# Patient Record
Sex: FEMALE | Race: WHITE | NOT HISPANIC OR LATINO | URBAN - METROPOLITAN AREA
[De-identification: names, ages, dates, MRNs, and addresses within clinical notes are randomized per-mention and may not be internally consistent; named-entity substitution may affect disease eponyms.]

---

## 2018-02-23 ENCOUNTER — OFFICE VISIT CONVERTED (OUTPATIENT)
Dept: PULMONOLOGY | Facility: CLINIC | Age: 78
End: 2018-02-23
Attending: INTERNAL MEDICINE

## 2018-02-28 ENCOUNTER — OFFICE VISIT CONVERTED (OUTPATIENT)
Dept: PULMONOLOGY | Facility: CLINIC | Age: 78
End: 2018-02-28
Attending: INTERNAL MEDICINE

## 2021-05-28 VITALS
HEART RATE: 82 BPM | HEART RATE: 84 BPM | DIASTOLIC BLOOD PRESSURE: 58 MMHG | BODY MASS INDEX: 43.1 KG/M2 | HEIGHT: 62 IN | WEIGHT: 234.19 LBS | TEMPERATURE: 98.3 F | RESPIRATION RATE: 12 BRPM | RESPIRATION RATE: 14 BRPM | WEIGHT: 237 LBS | SYSTOLIC BLOOD PRESSURE: 146 MMHG | TEMPERATURE: 97.9 F | BODY MASS INDEX: 43.61 KG/M2 | HEIGHT: 62 IN | SYSTOLIC BLOOD PRESSURE: 143 MMHG | DIASTOLIC BLOOD PRESSURE: 71 MMHG | OXYGEN SATURATION: 94 % | OXYGEN SATURATION: 98 %

## 2021-05-28 NOTE — PROGRESS NOTES
Patient: NINO EDWARDS     Acct: [id number]     Report: #[id number]  UNIT #: [id number]     : 1940    Encounter Date:2018  PRIMARY CARE: RAJWINDER MADERA  ***Signed***  --------------------------------------------------------------------------------------------------------------------  Chief Complaint      Encounter Date      2018            Referring Provider      KVNG SAHU            Patient Complaint      Patient is complaining of       1 Week F/U Pft Results/ Cxr Results            VITALS      Height 5 ft 2.00 in / 157.48 cm      Weight 234 lbs 3.000 oz / 106.25691 kg      BSA 2.22 m2      BMI 42.8 kg/m2      Temperature 97.9 F / 36.61 C - Oral      Pulse 84      Respirations 12      Blood Pressure 146/58 Sitting, Left Arm      Pulse Oximetry 94%, room aior@rest            HPI      The patient is a 77 year old obese female who presents for short term follow up     for surgical clearance. She was last seen in the office one week ago at which     time pulmonary function testing was performed and she was started on Utibron.      Since starting Utibron, the patient states she has been able to be more active     and has less short of breath.  She does have a diagnosis of COPD and prior     smoking history of 25 pack years.  Today she is denying any wheezing, dyspnea     at rest, hemoptysis, fevers, night sweats or chills.  She is accompanied by her     son today. She is anticipating surgery in the near future PE tubes. She has     chronic otitis media.              REVIEW OF SYSTEMS:  Updated in the chart.            Past medical, family, social and surgical history updated in the chart,     unchanged since last visit.            MEDICATIONS:  Reviewed and updated.            ROS      Constitutional:  Denies: Fatigue, Fever, Weight gain, Weight loss, Chills,     Insomnia, Other      Respiratory/Breathing:  Denies: Shortness of air, Wheezing, Cough, Hemoptysis,     Pleuritic pain,  Other      Endocrine:  Denies: Polydipsia, Polyuria, Heat/cold intolerance, Abnorml     menstrual pattern, Diabetes, Other      Eyes:  Denies: Blurred vision, Vision Changes, Other      Ears, nose, mouth, throat:  Denies: Congestion, Dysphagia, Hearing Changes,     Nose Bleeding, Nasal Discharge, Throat pain, Tinnitus, Other      Cardiovascular:  Denies: Chest Pain, Exertional dyspnea, Peripheral Edema,     Palpitations, Syncope, Wake up Gasping for air, Orthopnea, Tachycardia, Other      Gastrointestinal:  Denies: Abdominal pain/cramping, Bloody stools, Constipation    , Diarrhea, Melena, Nausea, Vomiting, Other      Genitourinary:  Denies: Dysuria, Urinary frequency, Incontinence, Hematuria,     Urgency, Other      Musculoskeletal:  Denies: Joint Pain, Joint Stiffness, Joint Swelling, Myalgias    , Other      Hematologic/lymphatic:  DENIES: Lymphadenopathy, Bruising, Bleeding tendencies,     Other      Neurologic:  Denies: Headache, Numbness, Weakness, Seizures, Other      Psychiatric:  Denies: Anxiety, Appropriate Effect, Depression, Other      Sleep:  No: Excessive daytime sleep, Morning Headache?, Snoring, Insomnia?,     Stop breathing at sleep?, Other      Integumentary:  Denies: Rash, Dry skin, Skin Warm to Touch, Other            FAMILY/SOCIAL/MEDICAL HX      Surgical History:  Yes: Head Surgery (TUBES IN EARS), Oral Surgery (THROAT-    UVULAE), Orthopedic Surgery (RIGHT WRIST-METAL PLATE, PINS)      Heart - Family Hx:  Sister, Grandparent      Diabetes - Family Hx:  Brother, Sister      Is Father Still Living?:  No      Is Mother Still Living?:  No      Social History:  Tobacco Use      Smoking status:  Former smoker (1ppd 25 years )      Hysterectomy:  Yes      Anticoagulation Therapy:  Yes      Antibiotic Prophylaxis:  No      Medical History:  Yes: Arthritis (GOUT), Blood Disease (ANEMIA), Chemotherapy/    Cancer (CERVICAL), Chronic Bronchitis/COPD (INHALER), Congestive Heart Failu (    YEARS AGO),  "Deafness or Ringing Ears (LEFT EAR), Diabetes (TYPE II B/S 98),     High Blood Pressure (ON MEDS), Shortness Of Breath, No: Hemorrhoids/Rectal Prob    , Miscellaneous Medical/oth      Psychiatric History      Anxiety            Hx Influenza Vaccination:  Yes      Influenza Vaccine Declined:  No      Hx Pneumococcal Vaccination:  Yes (UTD)      Encouraged to follow-up with:  PCP regarding preventative exams.      Chart initiated by      dick reilly ma            ALLERGIES/MEDICATIONS      Allergies:        Coded Allergies:             PENICILLINS (Verified  Allergy, Mild, RASH, 2/28/18)           NSAIDS (NON-STEROIDAL ANTI-INFLAMMA (Verified  Adverse Reaction, Unknown, 2 /28/18)       \"tears stomach up\"      Medications    Last Reconciled on 3/1/18 08:18 by HILTON DORAN DO      Indacaterol/Glycopyrrolate (Utibron Neohaler 27.5-15.6 Mcg) 1 Each Cap.w.dev      1 PUFF INH RTBID, #1 CAP.W.DEV 3 Refills         Prov: HILTON DORAN         2/23/18       Ferrous Sulfate (Ferrous Sulfate*) 325 Mg Tablet      325 MG PO BID, #60 TAB 0 Refills         Reported         6/9/17       Betamethasone Dipropionate (Betanate 0.05%*) 15 Gm Cream..g.      1 APL TOPICAL QDAY, #1 TUBE         Reported         6/9/17       (Otezla)   No Conflict Check               Reported         6/9/17       Gabapentin (Gabapentin) 100 Mg Capsule      100 MG PO TID, #90 CAP 0 Refills         Reported         6/9/17       Sucralfate (Carafate) 1 Gm Tab      1 GM PO ACHS, #120 TAB 5 Refills         Prov: Silverio Judge         9/30/16       Pantoprazole (Protonix*) 40 Mg Tablet.      40 MG PO QDAY@07, #30 TAB 5 Refills         Prov: Silverio Judge         9/30/16       Insulin Human Aspart (NovoLOG FLEXPEN*) 100 Unit/1 Ml Insuln.pen      5 UNITS SUBQ QID INSULIN, #1 BOX 0 Refills         Reported         9/30/16       Butalbital/APAP/Caffeine 50/325/40 MG (Fioricet) 1 Each Capsule      1 EACH PO Q4-6H Y for HEADACHE, TAB 0 Refills         Reported      "    9/30/16       Cyanocobalamin (Vitamin B-12*) 1,000 Mcg Tablet      1000 MCG PO QDAY, #30 TAB 0 Refills         Reported         9/30/16       Clopidogrel Bisulfate (Plavix) 75 Mg Tablet      75 MG PO QDAY, #30 TAB 0 Refills         Reported         9/30/16       Albuterol/Ipratropium (Duoneb) 3 Ml Ampul.neb      3 ML INH RTQ6H, #120 NEB 0 Refills         Reported         9/28/16       Insulin Detemir (Levemir VIAL*) 100 Unit/1 Ml Vial      80 UNITS SUBQ HS, #1 VIAL 0 Refills         Reported         9/28/16       Acetaminophen/Hydrocodone 10/325* (Hydrocodone/Acetaminophen 10/325*) 1 Each     Tablet      1 TAB PO Q6H, TAB         Reported         9/28/16       rOPINIRole HCl (Requip) 0.25 Mg Tab      0.25 MG PO HS for 30 Days, #30 TAB         Reported         9/28/16       Potassium Chloride (K-Tab*) 10 Meq Tablet.er      10 MEQ PO QDAY, TAB         Reported         9/28/16       Furosemide* (Lasix*) 80 Mg Tablet      80 MG PO QDAY, #60 TAB 0 Refills         Reported         9/28/16       Pregabolin (Lyrica*) 75 Mg Cap      150 MG PO BID, CAP         Reported         9/28/16       ALPRAZolam (Xanax) 0.5 Mg Tab      0.5 MG PO TID, TAB 0 Refills         Reported         9/28/16       Carvedilol (Carvedilol) 6.25 Mg Tablet      6.25 MG PO BID, #60 TAB 0 Refills         Reported         9/28/16       Atorvastatin Calcium (Lipitor*) 40 Mg Tablet      40 MG PO QDAY, #30 TAB 0 Refills         Reported         9/28/16       Allopurinol (Allopurinol) 300 Mg Tablet      300 MG PO QDAY, #30 TAB 0 Refills         Reported         9/28/16       Venlafaxine XR (Effexor XR) 150 Mg Cap.er.24h      150 MG PO QDAY for 30 Days, #30 CAP.SR         Reported         9/28/16       Levothyroxine (Synthroid) 150 Mcg Tablet      0.15 MG PO QDAY@07, #30 TAB 0 Refills         Reported         9/28/16      Current Medications      Current Medications Reviewed 2/28/18            EXAM      VITAL SIGNS:  Reviewed.        GENERAL: Morbidly obese  female, nondyspneic at rest on room air.        NECK:  Supple without tracheal deviation or lymphadenopathy.  No thyromegaly     appreciated.      LYMPHATICS:  No cervical or supraclavicular lymphadenopathy.      HEENT: Pupils are equal, round and reactive to light. There is no scleral     icterus.  Nares patent without hypertrophy of the turbinates. No erythema of     the passages. The posterior pharynx is without  lesions or erythema.  She has     purulent otitis media bilaterally.        RESPIRATORY:  Diminished breath sounds at the bases.        CARDIOVASCULAR:  Regular rate and rhythm.  No murmurs, gallops or rubs.  No     lower extremity edema.  Equal radial pulses.        GI: Soft, nontender, nondistended, no organomegaly.  Bowel sounds present in     all four quadrants.      MUSCULOSKELETAL:  No joint effusions, erythema or warmth over the major joint     systems.      SKIN:  No rashes or lesions.      NEUROLOGIC: Cranial nerves II-XII are intact bilaterally.  Moves all     extremities. Ambulates with ease.      PSYCH:  Appropriate mood and affect.      Vitals      Vitals:             Height 5 ft 2.00 in / 157.48 cm           Weight 234 lbs 3.000 oz / 106.26090 kg           BSA 2.22 m2           BMI 42.8 kg/m2           Temperature 97.9 F / 36.61 C - Oral           Pulse 84           Respirations 12           Blood Pressure 146/58 Sitting, Left Arm           Pulse Oximetry 94%, room aior@rest            REVIEW      Results Reviewed      PCCS Results Reviewed?:  Yes Prev Lab Results, Yes Prev Radiology Results, Yes     Previous Mecial Records      Radiographic Results               [facility]                PACS RADIOLOGY REPORT            Patient: NINO EDWARDS   Acct: #[id number]   Report: #[id number]            UNIT #: [id number]    DOS: 18 1324      INSURANCE:MEDICARE PART A   LOCATION:[facility]     : 1940            PROVIDERS       ADMITTING:     ATTENDING: HILTON DORAN      FAMILY:  NONE,MD   ORDERING:  HILTON DORAN         OTHER:    DICTATING:  Primo Mann MD            REQ #:[id number]   EXAM:CXR2 - CHEST 2V AP PA LAT      REASON FOR EXAM:  SOA      REASON FOR VISIT:  SOA            *******Signed******         PROCEDURE:   CHEST AP/PA AND LATERAL             COMPARISON:   None.             INDICATIONS:   SOA, PRE-OP MASS ON EAR             FINDINGS:         Linear density in the mid to lower left lung and right lung base likely     represents scarring or       atelectasis.  No consolidation is seen.  Mild cardiomegaly is noted.  No     effusion is present.             CONCLUSION:         1. Suspected mild bibasilar scarring or atelectasis.      2. Mild cardiomegaly.              Primo Mann M.D.             Electronically Signed and Approved By: Primo Mann M.D. on 2/23/2018 at 15:33                   Until signed, this is an unconfirmed preliminary report that may contain      errors and is subject to change.                                              WURRO:      D:02/23/18 1533      PFT Results      Reviewed PFTs.  The FEV1/FVC ratio was 98% of predicted, FEV1 70% of predicted,     FVC 71% of predicted.            Lung volumes showed airtrapping with residual lung 154%, DLCO was decreased at     62%, but corrected to normal and corrected for lung volumes to 90% of predicted.            PLAN      Assessment      ASSESSMENT:       The patient is a 77 year old female with morbid obesity, COPD and a 25 pack     year smoking history in remission who presents for surgical clearance for ear     surgery.        1.  Morbid obesity, BMI 42.8.      2.  COPD.      3. Bilateral atelectasis.      4.  Dyspnea on exertion.            PLAN:        1. This patient is moderate risk for a low risk surgery based on her poorly     controlled COPD, poor performance status and morbid obesity with untreated     sleep apnea. To minimize the post-operative  complications I recommend limiting     the duration of general anesthesia to the as short duration as possible, early     ambulation post-operatively and use of incentive spirometer .        2.  She would benefit from workup for sleep apnea based on her body habitus,     but refuses at current time.        4. I would like to see the patient back after her PFT's and follow up in 1-2     months to see how she is doing on Utibron.  Additionally, I will re-discuss     with her at that time referral for a sleep specialist and possible pap therapy.      5.  I counseled the patient on weight loss.            Patient Education      Education resources provided:  Yes      Patient Education Provided:  COPD, How to use an Inhaler, Sleep Apnea                 Disclaimer: Converted document may not contain table formatting or lab diagrams. Please see [employer] for the authenticated document.

## 2021-05-28 NOTE — PROGRESS NOTES
Patient: NINO EDWARDS     Acct: [id number]     Report: #[id number]  UNIT #: [id number]     : 1940    Encounter Date:2018  PRIMARY CARE: RAJWINDER MADERA  ***Signed***  --------------------------------------------------------------------------------------------------------------------  Chief Complaint      Encounter Date      2018            Referring Provider      KVNG SAHU            Patient Complaint      Patient is complaining of      Copd, Surgical Clearance             VITALS      Height 5 ft 2 in / 157.48 cm      Weight 237 lbs 0 oz / 107.928082 kg      BSA 2.23 m2      BMI 43.3 kg/m2      Temperature 98.3 F / 36.83 C - Oral      Pulse 82      Respirations 14      Blood Pressure 143/71 Sitting, Right Arm      Pulse Oximetry 98%, roomair@rest            HPI      The patient is a very pleasant 77 year old female who presents for evaluation     of chronic obstructive pulmonary disease and surgical clearance. The patient     states that she has been diagnosed with chronic obstructive pulmonary disease     many years ago and used to be a very heavy smoker. She quit smoking over 10     years ago but did have a 25 pack year smoking history. She has chronic     shortness of breath, chronic wheezing and cough. Her cough is worse in the     morning upon awakening and productive of thick white sputum. She is currently     on no inhaler therapy. She has chronic otitis media and is undergoing     evaluation for ear tubes. She would like to have surgical clearance based on     her history of chronic obstructive pulmonary disease. Unfortunately there are     no pulmonary function tests or six minute walk test for my review today. In     talking with the patient, she says she is unable to walk up 1 flight of stairs     without having dyspnea. Additionally, she is short of breath sometimes even at     rest. She uses a fan to help her breath better when she has trouble breathing.              PAST MEDICAL HISTORY:      1. Osteoarthritis and gout.       2. Chronic anemia.       3. History of cervical cancer.       4. Chronic obstructive pulmonary disease.       5. Congestive heart failure.       6. Deafness/hard of hearing.       7. Diabetes type II.       8. Hypertension.       9.  Chronic dyspnea on exertion .             PAST SURGICAL HISTORY:      1. Orthopedic surgery to the right wrist.             FAMILY HISTORY:      Significant for heart disease and diabetes in 2 sisters. Brother with a history     of diabetes and a grandparent with heart disease.             SOCIAL HISTORY:      The patient is a former smoker with 25 pack year smoking history.             Medications were reviewed by myself with the patient and updated in the chart.            ROS      Constitutional:  Denies: Fatigue, Fever, Weight gain, Weight loss, Chills,     Insomnia, Other      Respiratory/Breathing:  Complains of: Shortness of air, Wheezing, Cough, Denies    : Hemoptysis, Pleuritic pain, Other      Endocrine:  Denies: Polydipsia, Polyuria, Heat/cold intolerance, Abnorml     menstrual pattern, Diabetes, Other      Eyes:  Denies: Blurred vision, Vision Changes, Other      Ears, nose, mouth, throat:  Denies: Congestion, Dysphagia, Hearing Changes,     Nose Bleeding, Nasal Discharge, Throat pain, Tinnitus, Other      Cardiovascular:  Denies: Chest Pain, Exertional dyspnea, Peripheral Edema,     Palpitations, Syncope, Wake up Gasping for air, Orthopnea, Tachycardia, Other      Gastrointestinal:  Denies: Abdominal pain/cramping, Bloody stools, Constipation    , Diarrhea, Melena, Nausea, Vomiting, Other      Genitourinary:  Denies: Dysuria, Urinary frequency, Incontinence, Hematuria,     Urgency, Other      Musculoskeletal:  Denies: Joint Pain, Joint Stiffness, Joint Swelling, Myalgias    , Other      Hematologic/lymphatic:  DENIES: Lymphadenopathy, Bruising, Bleeding tendencies,     Other      Neurologic:  Denies: Headache,  "Numbness, Weakness, Seizures, Other      Psychiatric:  Denies: Anxiety, Appropriate Effect, Depression, Other      Sleep:  No: Excessive daytime sleep, Morning Headache?, Snoring, Insomnia?,     Stop breathing at sleep?, Other      Integumentary:  Denies: Rash, Dry skin, Skin Warm to Touch, Other            FAMILY/SOCIAL/MEDICAL HX      Surgical History:  Yes: Head Surgery (TUBES IN EARS), Oral Surgery (THROAT-    UVULAE), Orthopedic Surgery (RIGHT WRIST-METAL PLATE, PINS)      Heart - Family Hx:  Sister, Grandparent      Diabetes - Family Hx:  Brother, Sister      Is Father Still Living?:  No      Is Mother Still Living?:  No      Social History:  Tobacco Use      Smoking status:  Former smoker (1ppd 25 years )      Hysterectomy:  Yes      Anticoagulation Therapy:  Yes      Antibiotic Prophylaxis:  No      Medical History:  Yes: Arthritis (GOUT), Blood Disease (ANEMIA), Chemotherapy/    Cancer (CERVICAL), Chronic Bronchitis/COPD (INHALER), Congestive Heart Failu (    YEARS AGO), Deafness or Ringing Ears (LEFT EAR), Diabetes (TYPE II B/S 98),     High Blood Pressure (ON MEDS), Shortness Of Breath, No: Hemorrhoids/Rectal Prob    , Miscellaneous Medical/oth            Hx Influenza Vaccination:  Yes      Influenza Vaccine Declined:  No      2 or More Falls Past Year?:  No      Fall Past Year with Injury?:  No      Hx Pneumococcal Vaccination:  Yes (UTD)      Encouraged to follow-up with:  PCP regarding preventative exams.      Chart initiated by      rogelio reilly ma            ALLERGIES/MEDICATIONS      Allergies:        Coded Allergies:             PENICILLINS (Verified  Allergy, Mild, RASH, 2/23/18)           NSAIDS (NON-STEROIDAL ANTI-INFLAMMA (Verified  Adverse Reaction, Unknown, 2 /23/18)       \"tears stomach up\"      Medications    Last Reconciled on 2/23/18 10:45 by ROGELIO REILLY      Ferrous Sulfate (Ferrous Sulfate*) 325 Mg Tablet      325 MG PO BID, #60 TAB 0 Refills         Reported         6/9/17     "   Betamethasone Dipropionate (Betanate 0.05%*) 15 Gm Cream..g.      1 APL TOPICAL QDAY, #1 TUBE         Reported         6/9/17       (Otezla)   No Conflict Check               Reported         6/9/17       Gabapentin (Gabapentin) 100 Mg Capsule      100 MG PO TID, #90 CAP 0 Refills         Reported         6/9/17       Sucralfate (Carafate) 1 Gm Tab      1 GM PO ACHS, #120 TAB 5 Refills         Prov: Silverio Judge         9/30/16       Pantoprazole (Protonix*) 40 Mg Tablet.dr      40 MG PO QDAY@07, #30 TAB 5 Refills         Prov: Silverio Judge         9/30/16       Insulin Human Aspart (NovoLOG FLEXPEN*) 100 Unit/1 Ml Insuln.pen      5 UNITS SUBQ QID INSULIN, #1 BOX 0 Refills         Reported         9/30/16       Butalbital/APAP/Caffeine 50/325/40 MG (Fioricet) 1 Each Capsule      1 EACH PO Q4-6H Y for HEADACHE, TAB 0 Refills         Reported         9/30/16       Cyanocobalamin (Vitamin B-12*) 1,000 Mcg Tablet      1000 MCG PO QDAY, #30 TAB 0 Refills         Reported         9/30/16       Clopidogrel Bisulfate (Plavix) 75 Mg Tablet      75 MG PO QDAY, #30 TAB 0 Refills         Reported         9/30/16       Albuterol/Ipratropium (Duoneb) 3 Ml Ampul.neb      3 ML INH RTQ6H, #120 NEB 0 Refills         Reported         9/28/16       Insulin Detemir (Levemir VIAL*) 100 Unit/1 Ml Vial      80 UNITS SUBQ HS, #1 VIAL 0 Refills         Reported         9/28/16       Acetaminophen/Hydrocodone 10/325* (Hydrocodone/Acetaminophen 10/325*) 1 Each     Tablet      1 TAB PO Q6H, TAB         Reported         9/28/16       rOPINIRole HCl (Requip) 0.25 Mg Tab      0.25 MG PO HS for 30 Days, #30 TAB         Reported         9/28/16       Potassium Chloride (K-Tab*) 10 Meq Tablet.er      10 MEQ PO QDAY, TAB         Reported         9/28/16       Furosemide* (Lasix*) 80 Mg Tablet      80 MG PO QDAY, #60 TAB 0 Refills         Reported         9/28/16       Pregabolin (Lyrica*) 75 Mg Cap      150 MG PO BID, CAP         Reported          9/28/16       ALPRAZolam (Xanax) 0.5 Mg Tab      0.5 MG PO TID, TAB 0 Refills         Reported         9/28/16       Carvedilol (Carvedilol) 6.25 Mg Tablet      6.25 MG PO BID, #60 TAB 0 Refills         Reported         9/28/16       Atorvastatin Calcium (Lipitor*) 40 Mg Tablet      40 MG PO QDAY, #30 TAB 0 Refills         Reported         9/28/16       Allopurinol (Allopurinol) 300 Mg Tablet      300 MG PO QDAY, #30 TAB 0 Refills         Reported         9/28/16       Venlafaxine XR (Effexor XR) 150 Mg Cap.er.24h      150 MG PO QDAY for 30 Days, #30 CAP.SR         Reported         9/28/16       Levothyroxine (Synthroid) 150 Mcg Tablet      0.15 MG PO QDAY@07, #30 TAB 0 Refills         Reported         9/28/16      Current Medications      Current Medications Reviewed 2/23/18            EXAM      Vital signs reviewed.      GENERAL:  Obese female with no conversational dyspnea, speaking in full     sentences on room air.       HEENT: Pupils are equally round and reactive to light and accommodation.      Extraocular muscles intact bilateral. Nares patent without hypertrophy of the     turbinates.  TM's are clear bilaterally. Small oropharynx without lesions or     erythema. Mallampati I.        NECK: Short, thick with increased diameter.   Supple without tracheal deviation     or lymphadenopathy. No thyromegaly appreciated.       LYMPHATICS: No cervical or supraclavicular lymphadenopathy.       RESPIRATORY:  Diminished breath sounds bilaterally, no wheezes, rales or rhonchi    , tympanic to percussion.      CVS:  Regular rate and rhythm, no murmurs, rubs or gallops, there is 1+ pitting     edema to the knees bilaterally , equal radial pulses.      GI: Abdomen soft, nontender, nondistended, no hepatomegaly appreciated.  Bowel     sounds present in all 4 quadrants.       MUSCULOSKELETAL: No erythema, warmth or fluctuance over the major joints     including the knees, ankles, wrists and elbows.        SKIN: No rashes or  lesions.       NEUROLOGICAL: Alert and oriented X 3.  No focal deficits on exam. Cranial     nerves II-XII intact bilaterally.       PSYCH: Patient has appropriate mood and affect.      Vitals      Vitals:             Height 5 ft 2 in / 157.48 cm           Weight 237 lbs 0 oz / 107.897586 kg           BSA 2.23 m2           BMI 43.3 kg/m2           Temperature 98.3 F / 36.83 C - Oral           Pulse 82           Respirations 14           Blood Pressure 143/71 Sitting, Right Arm           Pulse Oximetry 98%, roomair@rest            REVIEW      Results Reviewed      PCCS Results Reviewed?:  Yes Prev Radiology Results      Radiographic Results      I personally reviewed the patient's chest x-ray obtained today 02/23/18 that     showed bibasilar scarring and atelectasis seen. There was mild cardiomegaly. No     acute effusions or consolidations.      PFT Results      I personally reviewed the patient's pulmonary function tests performed today 02/ 23/18 showing FEV1/FVC ratio was 98% predicted with a decrease in FEV1 and FVC     individually. There was no response to bronchodilator administration. Her lung     volume showed airtrapping and she had mild reduction in her diffusion capacity.            PLAN      Assessment      Dyspnea on exertion - R06.09            COPD (chronic obstructive pulmonary disease) - J44.9            Notes      New Medications      * Indacaterol/Glycopyrrolate (Utibron Neohaler 27.5-15.6 Mcg) 1 EACH CAP.W.DEV:     1 PUFF INH RTBID #1      New Diagnostics      * PFT-Comp, PrePost,DLCO,BodyBox, Week       Dx: Dyspnea on exertion - R06.09      * Chest 2 View, Week       Dx: Dyspnea on exertion - R06.09      New Office Procedures      * Prevnar 0.5 PCV13, As Soon As Possible       Pneumoc 13-Yu Conj-Dip CRm/Pf (Prevnar 13 Syringe) 0.5 ML SYRINGE: 0.5     MILLILITER INTRAMUSCULARLY Qty 1 SYRINGE       Dx: COPD (chronic obstructive pulmonary disease) - J44.9      ASSESSMENT:      The patient is a  very pleasant 77 year old former tobacco user with a history     of chronic dyspnea with exertion. She presents for surgical clearance for     otitis media for tympanic tubes placed in the ears.             1. Chronic dyspnea on exertion.       2. Morbid obesity with BMI 42.8.       3. History of chronic obstructive pulmonary disease not otherwise specified.       4. History of emphysema.             PLAN:       1. Start the patient on Utibron (LAMA/LABA) therapy. Continue albuterol as     needed.       2. The patient was given a Prevnar vaccine today in the office.       3. The patient is moderate risk for low risk surgery.            Patient Education      Patient Education Provided:  COPD, How to use an Inhaler            Patient Education:        Chronic Obstructive Pulmonary Disease                 Disclaimer: Converted document may not contain table formatting or lab diagrams. Please see [employer] for the authenticated document.